# Patient Record
Sex: FEMALE | Race: BLACK OR AFRICAN AMERICAN | Employment: OTHER | URBAN - METROPOLITAN AREA
[De-identification: names, ages, dates, MRNs, and addresses within clinical notes are randomized per-mention and may not be internally consistent; named-entity substitution may affect disease eponyms.]

---

## 2018-03-19 ENCOUNTER — OFFICE VISIT (OUTPATIENT)
Dept: CARDIOLOGY CLINIC | Age: 78
End: 2018-03-19

## 2018-03-19 VITALS
HEIGHT: 60 IN | BODY MASS INDEX: 19.79 KG/M2 | WEIGHT: 100.8 LBS | DIASTOLIC BLOOD PRESSURE: 86 MMHG | RESPIRATION RATE: 18 BRPM | SYSTOLIC BLOOD PRESSURE: 120 MMHG | OXYGEN SATURATION: 98 % | HEART RATE: 68 BPM

## 2018-03-19 DIAGNOSIS — G30.9 ALZHEIMER'S DEMENTIA WITHOUT BEHAVIORAL DISTURBANCE, UNSPECIFIED TIMING OF DEMENTIA ONSET: ICD-10-CM

## 2018-03-19 DIAGNOSIS — I10 ESSENTIAL HYPERTENSION: ICD-10-CM

## 2018-03-19 DIAGNOSIS — F02.80 ALZHEIMER'S DEMENTIA WITHOUT BEHAVIORAL DISTURBANCE, UNSPECIFIED TIMING OF DEMENTIA ONSET: ICD-10-CM

## 2018-03-19 DIAGNOSIS — R06.09 DOE (DYSPNEA ON EXERTION): Primary | ICD-10-CM

## 2018-03-19 DIAGNOSIS — I25.10 CORONARY ARTERY DISEASE INVOLVING NATIVE CORONARY ARTERY WITHOUT ANGINA PECTORIS, UNSPECIFIED WHETHER NATIVE OR TRANSPLANTED HEART: ICD-10-CM

## 2018-03-19 DIAGNOSIS — E78.5 DYSLIPIDEMIA: ICD-10-CM

## 2018-03-19 DIAGNOSIS — Z95.5 S/P CORONARY ARTERY STENT PLACEMENT: ICD-10-CM

## 2018-03-19 RX ORDER — METOPROLOL TARTRATE 25 MG/1
TABLET, FILM COATED ORAL 2 TIMES DAILY
COMMUNITY

## 2018-03-19 RX ORDER — CANE TIPS
EACH MISCELLANEOUS
Qty: 1 DEVICE | Refills: 0 | Status: SHIPPED | OUTPATIENT
Start: 2018-03-19

## 2018-03-19 RX ORDER — LANOLIN ALCOHOL/MO/W.PET/CERES
1000 CREAM (GRAM) TOPICAL DAILY
COMMUNITY

## 2018-03-19 RX ORDER — LOSARTAN POTASSIUM 50 MG/1
TABLET ORAL DAILY
COMMUNITY

## 2018-03-19 RX ORDER — ATORVASTATIN CALCIUM 40 MG/1
TABLET, FILM COATED ORAL DAILY
COMMUNITY

## 2018-03-19 RX ORDER — ALBUTEROL SULFATE 90 UG/1
AEROSOL, METERED RESPIRATORY (INHALATION)
COMMUNITY

## 2018-03-19 RX ORDER — CLOPIDOGREL BISULFATE 75 MG/1
TABLET ORAL
COMMUNITY

## 2018-03-19 RX ORDER — RANOLAZINE 500 MG/1
TABLET, EXTENDED RELEASE ORAL 2 TIMES DAILY
COMMUNITY

## 2018-03-19 RX ORDER — GUAIFENESIN 100 MG/5ML
81 LIQUID (ML) ORAL DAILY
COMMUNITY

## 2018-03-19 RX ORDER — LANOLIN ALCOHOL/MO/W.PET/CERES
400 CREAM (GRAM) TOPICAL DAILY
COMMUNITY

## 2018-03-19 NOTE — MR AVS SNAPSHOT
[address] [id number] [address] [address] [phone] Patient: Santo Escalante MRN: [id number] [id number] Visit Information Date & Time Provider Department Dept. Phone Encounter #  
 3/19/2018  1:20 PM Priya Arias MD [facility] [place]  Follow-up Instructions Return in about 7 weeks (around 5/7/2018). Upcoming Health Maintenance Date Due DTaP/Tdap/Td series (1 - Tdap) 9/23/1961 ZOSTER VACCINE AGE 60> 7/23/2000 GLAUCOMA SCREENING Q2Y 9/23/2005 Bone Densitometry (Dexa) Screening 9/23/2005 Pneumococcal 65+ Low/Medium Risk (1 of 2 - PCV13) 9/23/2005 MEDICARE YEARLY EXAM 9/23/2005 Influenza Age 5 to Adult 8/1/2017 Allergies as of 3/19/2018  Review Complete On: 3/19/2018 By: Priya Arias MD  
  
 Severity Noted Reaction Type Reactions Pcn [Penicillins]  03/19/2018    Hives Current Immunizations  Never Reviewed No immunizations on file. Not reviewed this visit You Were Diagnosed With   
  
 Codes Comments CRUZ (dyspnea on exertion)    -  Primary ICD-10-CM: R06.09 
ICD-9-CM: 786.09 S/P coronary artery stent placement     ICD-10-CM: Z95.5 ICD-9-CM: V45.82 Coronary artery disease involving native coronary artery without angina pectoris, unspecified whether native or transplanted heart     ICD-10-CM: I25.10 ICD-9-CM: 414.01 Essential hypertension     ICD-10-CM: I10 
ICD-9-CM: 401.9 Dyslipidemia     ICD-10-CM: E78.5 ICD-9-CM: 272.4 Alzheimer's dementia without behavioral disturbance, unspecified timing of dementia onset     ICD-10-CM: G30.9, F02.80 ICD-9-CM: 331.0, 294.10 Vitals BP Pulse Resp Height(growth percentile) Weight(growth percentile) SpO2  
 120/86 (BP 1 Location: Right arm, BP Patient Position: Supine) 68 18 5' (1.524 m) 100 lb 12.8 oz (45.7 kg) 98% BMI OB Status Smoking Status 19.69 kg/m2 Menopause Never Smoker Vitals History BMI and BSA Data Body Mass Index Body Surface Area  
 19.69 kg/m 2 1.39 m 2 Your Updated Medication List  
  
   
This list is accurate as of 3/19/18  2:27 PM.  Always use your most recent med list.  
  
  
  
  
 albuterol 90 mcg/actuation inhaler Commonly known as:  PROVENTIL HFA, VENTOLIN HFA, PROAIR HFA Take  by inhalation. aspirin 81 mg chewable tablet Take 81 mg by mouth daily. atorvastatin 40 mg tablet Commonly known as:  LIPITOR Take  by mouth daily. Kana [place] Use as directed  
  
 cyanocobalamin 1,000 mcg tablet Take 1,000 mcg by mouth daily. losartan 50 mg tablet Commonly known as:  COZAAR Take  by mouth daily. magnesium oxide 400 mg tablet Commonly known as:  MAG-OX Take 400 mg by mouth daily. metoprolol tartrate 25 mg tablet Commonly known as:  LOPRESSOR Take  by mouth two (2) times a day. NAMZARIC 28-10 mg Cspx Generic drug:  memantine-donepezil Take  by mouth. PLAVIX 75 mg Tab Generic drug:  clopidogrel Take  by mouth. RANEXA 500 mg SR tablet Generic drug:  ranolazine ER Take  by mouth two (2) times a day. vitamin D3-folic acid 849 unit- 1 mg Tab Take  by mouth. Prescriptions Printed Refills Cane wolf 0 Sig: Use as directed Class: Print We Performed the Following AMB POC EKG ROUTINE W/ 12 LEADS, INTER & REP [57009 CPT(R)] Follow-up Instructions Return in about 7 weeks (around 5/7/2018). To-Do List   
 03/19/2018 ECG:  CARDIAC SPECT REST AND STRESS   
  
 03/19/2018 ECG:  NUCLEAR STRESS TEST Referral Information Referral ID Referred By Referred To  
  
 [id number] Cristine PAUL Not Available Visits Status Start Date End Date 1 New Request 3/19/18 3/19/19  If your referral has a status of pending review or denied, additional information will be sent to support the outcome of this decision. Referral ID Referred By Referred To  
 [id number] Linda PAUL Not Available Visits Status Start Date End Date 1 New Request 3/19/18 3/19/19 If your referral has a status of pending review or denied, additional information will be sent to support the outcome of this decision. [facility]! [facility] introduces [employer] patient portal. Now you can access parts of your medical record, email your doctor's office, and request medication refills online. 1. In your internet browser, go to [email or web address] 2. Click on the First Time User? Click Here link in the Sign In box. You will see the New Member Sign Up page. 3. Enter your [employer] Access Code exactly as it appears below. You will not need to use this code after youve completed the sign-up process. If you do not sign up before the expiration date, you must request a new code. · [employer] Access Code: [email or web address] Expires: 5/20/2018 11:02 AM 
 
4. Enter the last four digits of your Social Security Number (xxxx) and Date of Birth (mm/dd/yyyy) as indicated and click Submit. You will be taken to the next sign-up page. 5. Create a [employer] ID. This will be your [employer] login ID and cannot be changed, so think of one that is secure and easy to remember. 6. Create a [employer] password. You can change your password at any time. 7. Enter your Password Reset Question and Answer. This can be used at a later time if you forget your password. 8. Enter your e-mail address. You will receive e-mail notification when new information is available in [address] 9. Click Sign Up. You can now view and download portions of your medical record. 10. Click the Download Summary menu link to download a portable copy of your medical information.  
 
If you have questions, please visit the Frequently Asked Questions section of the [employer]. Remember, [employer] is NOT to be used for urgent needs. For medical emergencies, dial 911. Now available from your iPhone and Android! Please provide this summary of care documentation to your next provider. If you have any questions after today's visit, please call [phone].

## 2018-03-19 NOTE — PROGRESS NOTES
HISTORY OF PRESENT ILLNESS  Bertha Tariq is a 68 y.o. female. HPI    Patient presents for annual office visit. She  recently relocated to the area to live with her daughter from [place], [place]. She has a past medical history significant for known coronary disease with drug-eluting stents to her LAD in 2015 and more recently in May 2016. Her last cardiac catheterization at that time demonstrated 80% LAD stenosis, left main with 30% stenosis, diagonal 1 with 60% stenosis in RCA with 50% stenosis. EF was 65%. Over the past year or 2, she has developed progressive Alzheimer's dementia. The patient cannot give a good history, but the daughter has noticed that she is having what appears to be more shortness of breath with activity over the past week or 2. She also feels that her activity level is overall decreased from her baseline. She has not been complaining of any chest pain or pressure. No orthopnea, PND or leg swelling. No claudication. No dizziness or syncope. Past Medical History:   Diagnosis Date    Alzheimer's dementia     Coronary artery disease 05/2016    Proximal LAD stenosis, status post drug-eluting stent. Left main residual 30%, diagonal 1 60%, RCA 50%    Dyslipidemia     H/O echocardiogram 05/2016    EF 65%, mild mitral regurgitation    Hypertension     S/P coronary artery stent placement 05/2016    Proximal LAD     Current Outpatient Prescriptions   Medication Sig Dispense Refill    memantine-donepezil (NAMZARIC) 28-10 mg CSpX Take  by mouth.  atorvastatin (LIPITOR) 40 mg tablet Take  by mouth daily.  magnesium oxide (MAG-OX) 400 mg tablet Take 400 mg by mouth daily.  aspirin 81 mg chewable tablet Take 81 mg by mouth daily.  losartan (COZAAR) 50 mg tablet Take  by mouth daily.  vitamin D3-folic acid 764 unit- 1 mg tab Take  by mouth.  albuterol (PROVENTIL HFA, VENTOLIN HFA, PROAIR HFA) 90 mcg/actuation inhaler Take  by inhalation.  metoprolol tartrate (LOPRESSOR) 25 mg tablet Take  by mouth two (2) times a day.  ranolazine ER (RANEXA) 500 mg SR tablet Take  by mouth two (2) times a day.  cyanocobalamin 1,000 mcg tablet Take 1,000 mcg by mouth daily.  clopidogrel (PLAVIX) 75 mg tab Take  by mouth.  Cane wolf Use as directed 1 Device 0     Allergies   Allergen Reactions    Pcn [Penicillins] Hives      Social History   Substance Use Topics    Smoking status: Never Smoker    Smokeless tobacco: Never Used    Alcohol use No     Family History   Problem Relation Age of Onset    Family history unknown: Yes           Review of Systems   Constitutional: Negative for chills, fever and weight loss. HENT: Negative for nosebleeds. Eyes: Negative for blurred vision and double vision. Respiratory: Positive for shortness of breath. Negative for cough and wheezing. Cardiovascular: Negative for chest pain, palpitations, orthopnea, claudication, leg swelling and PND. Gastrointestinal: Negative for abdominal pain, heartburn, nausea and vomiting. Genitourinary: Negative for dysuria and hematuria. Musculoskeletal: Negative for falls and myalgias. Skin: Negative for rash. Neurological: Negative for dizziness, focal weakness and headaches. Endo/Heme/Allergies: Does not bruise/bleed easily. Psychiatric/Behavioral: Negative for substance abuse. Visit Vitals    /86 (BP 1 Location: Right arm, BP Patient Position: Supine)    Pulse 68    Resp 18    Ht 5' (1.524 m)    Wt 45.7 kg (100 lb 12.8 oz)    SpO2 98%    BMI 19.69 kg/m2       Physical Exam   Constitutional: She is oriented to person, place, and time. She appears well-developed and well-nourished. HENT:   Head: Normocephalic and atraumatic. Eyes: Conjunctivae are normal.   Neck: Neck supple. No JVD present. Carotid bruit is not present. Cardiovascular: Normal rate, regular rhythm, S1 normal, S2 normal and normal pulses. Exam reveals no gallop. No murmur heard. Pulmonary/Chest: Effort normal and breath sounds normal. She has no wheezes. She has no rales. Abdominal: Soft. Bowel sounds are normal. There is no tenderness. Musculoskeletal: She exhibits no edema, tenderness or deformity. Neurological: She is alert and oriented to person, place, and time. Skin: Skin is warm and dry. Psychiatric: She has a normal mood and affect. Her behavior is normal. Thought content normal.     EKG: Normal sinus rhythm, leftward axis, borderline voltage criteria for LVH, nonspecific T-wave abnormality in the anteroseptal leads. No ST segment changes. Compared to the previous EKG, the nonspecific T-wave changes are new. ASSESSMENT and PLAN  Encounter Diagnoses   Name Primary?  CRUZ (dyspnea on exertion) Yes    S/P coronary artery stent placement     Coronary artery disease involving native coronary artery without angina pectoris, unspecified whether native or transplanted heart     Essential hypertension     Dyslipidemia     Alzheimer's dementia without behavioral disturbance, unspecified timing of dementia onset      Dyspnea on exertion. Patient is an otherwise very poor historian because of her dementia. This may represent an anginal equivalent especially with her history of coronary disease and prior LAD stenting. She does have new nonspecific T-wave inversions on her electrocardiogram today. I have recommended a pharmacologic nuclear stress test for further evaluation. As long as this is not a high risk study, I would continue with medical therapy. Coronary disease. History of drug-eluting stent placement to her proximal LAD in 2016. She had mild to moderate residual disease elsewhere on cardiac catheterization at that time. She has remained on dual antiplatelet therapy, a potent statin and dual antianginal therapy since that time. I will continue her current medical regimen. Essential hypertension.   Patient blood pressure appears to be well-controlled on her current regimen of metoprolol losartan, both of which I would continue. Dyslipidemia. Patient is been managed with atorvastatin 40 mg daily. I prefer LDL to be less than 70 given her history of CAD. Alzheimer's dementia. Apparently this has become progressively worse over the past 1-2 years and she now resides with her daughter in the area. Follow-up in 1-2 months, sooner if needed.

## 2018-03-29 ENCOUNTER — HOSPITAL ENCOUNTER (OUTPATIENT)
Dept: NON INVASIVE DIAGNOSTICS | Age: 78
Discharge: HOME | End: 2018-03-29
Attending: INTERNAL MEDICINE
Payer: COMMERCIAL

## 2018-03-29 DIAGNOSIS — I25.10 CORONARY ARTERY DISEASE INVOLVING NATIVE CORONARY ARTERY WITHOUT ANGINA PECTORIS, UNSPECIFIED WHETHER NATIVE OR TRANSPLANTED HEART: ICD-10-CM

## 2018-03-29 DIAGNOSIS — R06.09 DOE (DYSPNEA ON EXERTION): ICD-10-CM

## 2018-03-29 LAB
ATTENDING PHYSICIAN, CST07: NORMAL
DIAGNOSIS, 93000: NORMAL
DUKE TM SCORE RESULT, CST14: NORMAL
DUKE TREADMILL SCORE, CST13: NORMAL
ECG INTERP BEFORE EX, CST11: NORMAL
ECG INTERP DURING EX, CST12: NORMAL
FUNCTIONAL CAPACITY, CST17: NORMAL
KNOWN CARDIAC CONDITION, CST08: NORMAL
MAX. DIASTOLIC BP, CST04: 60 MMHG
MAX. HEART RATE, CST05: 107 BPM
MAX. SYSTOLIC BP, CST03: 120 MMHG
OVERALL BP RESPONSE TO EXERCISE, CST16: NORMAL
OVERALL HR RESPONSE TO EXERCISE, CST15: NORMAL
PEAK EX METS, CST10: 1 METS
PROTOCOL NAME, CST01: NORMAL
TEST INDICATION, CST09: NORMAL

## 2018-03-29 PROCEDURE — A9500 TC99M SESTAMIBI: HCPCS

## 2018-03-29 PROCEDURE — 74011000258 HC RX REV CODE- 258: Performed by: INTERNAL MEDICINE

## 2018-03-29 PROCEDURE — 74011250636 HC RX REV CODE- 250/636: Performed by: INTERNAL MEDICINE

## 2018-03-29 PROCEDURE — 78452 HT MUSCLE IMAGE SPECT MULT: CPT | Performed by: INTERNAL MEDICINE

## 2018-03-29 PROCEDURE — 93017 CV STRESS TEST TRACING ONLY: CPT | Performed by: INTERNAL MEDICINE

## 2018-03-29 RX ORDER — SODIUM CHLORIDE 9 MG/ML
100 INJECTION, SOLUTION INTRAVENOUS ONCE
Status: COMPLETED | OUTPATIENT
Start: 2018-03-29 | End: 2018-03-29

## 2018-03-29 RX ADMIN — REGADENOSON 0.4 MG: 0.08 INJECTION, SOLUTION INTRAVENOUS at 10:00

## 2018-03-29 RX ADMIN — SODIUM CHLORIDE 100 ML/HR: 900 INJECTION, SOLUTION INTRAVENOUS at 10:00

## 2018-03-29 NOTE — PROGRESS NOTES
Patient was given 10.76 milliCuries of 99mTc-Sestamibi for the resting images. Patient was also given 33.0 milliCuries of 99mTc-Sestamibi for the stress images. Injected with 0.4mg Lexiscan. Patient's armband was discarded and shredded.

## 2018-03-29 NOTE — PROCEDURES
[address]  MR#: [id number]  : 1940  ACCOUNT #: [de-identified]   DATE OF SERVICE: 2018    PHARMACOLOGICAL NUCLEAR STRESS TEST    INDICATION:  Coronary artery disease, dyspnea on exertion with concern for anginal equivalent. BASELINE EKG:  Sinus rhythm. Anterior T-wave abnormality. PROTOCOL:  Patient was given Lexiscan infusion to right antecubital IV. 10 mCi of sestamibi was injected for rest and 30 mCi sestamibi injected for stress. Gated process was performed. EKG FINDINGS:  No arrhythmias. No significant change beyond baseline. NUCLEAR FINDINGS:  Left ventricular systolic function is normal and calculated at 88%. No regional wall motion abnormalities, transient ischemic dilatation, ischemia or previous infarct. IMPRESSION:  1. Low risk pharmacological nuclear stress test.  2.  Normal left ventricular systolic function calculated at 88%. 3.  No ischemia or previous infarct based on perfusion imaging.       MD FERNANDO Price / RN  D: 2018 12:23     T: 2018 15:35  JOB #: [id number]

## 2018-03-30 ENCOUNTER — TELEPHONE (OUTPATIENT)
Dept: CARDIOLOGY CLINIC | Age: 78
End: 2018-03-30

## 2018-03-30 NOTE — TELEPHONE ENCOUNTER
----- Message from Maulik García MD sent at 3/30/2018  9:57 AM EDT -----  Please let the patient know that her nuclear stress test was normal  ----- Message -----     From: Michelle Peña     Sent: 3/30/2018   8:58 AM       To: Maulik García MD    Dyspnea on exertion. Patient is an otherwise very poor historian because of her dementia. This may represent an anginal equivalent especially with her history of coronary disease and prior LAD stenting. She does have new nonspecific T-wave inversions on her electrocardiogram today. I have recommended a pharmacologic nuclear stress test for further evaluation. As long as this is not a high risk study, I would continue with medical therapy.     Coronary disease. History of drug-eluting stent placement to her proximal LAD in 2016. She had mild to moderate residual disease elsewhere on cardiac catheterization at that time. She has remained on dual antiplatelet therapy, a potent statin and dual antianginal therapy since that time.   I will continue her current medical regimen.

## 2018-03-30 NOTE — PROGRESS NOTES
Dyspnea on exertion. Patient is an otherwise very poor historian because of her dementia. This may represent an anginal equivalent especially with her history of coronary disease and prior LAD stenting. She does have new nonspecific T-wave inversions on her electrocardiogram today. I have recommended a pharmacologic nuclear stress test for further evaluation. As long as this is not a high risk study, I would continue with medical therapy.     Coronary disease. History of drug-eluting stent placement to her proximal LAD in 2016. She had mild to moderate residual disease elsewhere on cardiac catheterization at that time. She has remained on dual antiplatelet therapy, a potent statin and dual antianginal therapy since that time. I will continue her current medical regimen.

## 2018-03-30 NOTE — TELEPHONE ENCOUNTER
Called patient. Verified  and full name. Informed about results per Dr Walker Hassan. Patient verbalized understanding and agreed with the plan of care.

## 2018-04-20 ENCOUNTER — HOSPITAL ENCOUNTER (OUTPATIENT)
Age: 78
Discharge: HOME | End: 2018-04-20
Attending: PSYCHIATRY & NEUROLOGY
Payer: COMMERCIAL

## 2018-04-20 DIAGNOSIS — G30.1 DEMENTIA OF THE ALZHEIMER'S TYPE, WITH LATE ONSET, WITH DELIRIUM (HCC): ICD-10-CM

## 2018-04-20 DIAGNOSIS — F02.82 DEMENTIA OF THE ALZHEIMER'S TYPE, WITH LATE ONSET, WITH DELIRIUM (HCC): ICD-10-CM

## 2018-04-20 PROCEDURE — 70551 MRI BRAIN STEM W/O DYE: CPT

## 2018-11-21 ENCOUNTER — HOSPITAL ENCOUNTER (EMERGENCY)
Age: 78
Discharge: HOME | End: 2018-11-21
Attending: EMERGENCY MEDICINE
Payer: MEDICARE

## 2018-11-21 ENCOUNTER — APPOINTMENT (OUTPATIENT)
Dept: GENERAL RADIOLOGY | Age: 78
End: 2018-11-21
Attending: PHYSICIAN ASSISTANT
Payer: MEDICARE

## 2018-11-21 VITALS
HEIGHT: 60 IN | DIASTOLIC BLOOD PRESSURE: 60 MMHG | WEIGHT: 87 LBS | SYSTOLIC BLOOD PRESSURE: 117 MMHG | RESPIRATION RATE: 18 BRPM | HEART RATE: 67 BPM | OXYGEN SATURATION: 100 % | TEMPERATURE: 98.3 F | BODY MASS INDEX: 17.08 KG/M2

## 2018-11-21 DIAGNOSIS — K59.00 CONSTIPATION, UNSPECIFIED CONSTIPATION TYPE: Primary | ICD-10-CM

## 2018-11-21 LAB
APPEARANCE UR: CLEAR
BACTERIA URNS QL MICRO: NEGATIVE /HPF
BILIRUB UR QL: NEGATIVE
COLOR UR: YELLOW
EPITH CASTS URNS QL MICRO: NORMAL /LPF (ref 0–5)
GLUCOSE UR STRIP.AUTO-MCNC: NEGATIVE MG/DL
HGB UR QL STRIP: NEGATIVE
KETONES UR QL STRIP.AUTO: NEGATIVE MG/DL
LEUKOCYTE ESTERASE UR QL STRIP.AUTO: ABNORMAL
NITRITE UR QL STRIP.AUTO: NEGATIVE
PH UR STRIP: 5.5 [PH] (ref 5–8)
PROT UR STRIP-MCNC: NEGATIVE MG/DL
RBC #/AREA URNS HPF: NORMAL /HPF (ref 0–5)
SP GR UR REFRACTOMETRY: 1.01 (ref 1–1.03)
UROBILINOGEN UR QL STRIP.AUTO: 0.2 EU/DL (ref 0.2–1)
WBC URNS QL MICRO: NORMAL /HPF (ref 0–4)

## 2018-11-21 PROCEDURE — 99283 EMERGENCY DEPT VISIT LOW MDM: CPT

## 2018-11-21 PROCEDURE — 74019 RADEX ABDOMEN 2 VIEWS: CPT

## 2018-11-21 PROCEDURE — 81001 URINALYSIS AUTO W/SCOPE: CPT

## 2018-11-21 RX ORDER — LACTULOSE 10 G/15ML
20 SOLUTION ORAL; RECTAL 2 TIMES DAILY
Qty: 480 ML | Refills: 0 | Status: SHIPPED | OUTPATIENT
Start: 2018-11-21 | End: 2018-11-29

## 2018-11-21 RX ORDER — GLYCERIN ADULT
1 SUPPOSITORY, RECTAL RECTAL
Qty: 1 SUPPOSITORY | Refills: 0 | Status: SHIPPED | OUTPATIENT
Start: 2018-11-21 | End: 2018-11-21

## 2018-11-21 NOTE — ED NOTES
Julita Hernandez is a 66 y.o. female that was discharged in stable. Pt was accompanied by daughter. Pt is not driving. The patients diagnosis, condition and treatment were explained to  patient and aftercare instructions were given. The patient and caregiver verbalized understanding. Patient armband removed and shredded.

## 2018-11-21 NOTE — DISCHARGE INSTRUCTIONS
Constipation: Care Instructions  Your Care Instructions    Constipation means that you have a hard time passing stools (bowel movements). People pass stools from 3 times a day to once every 3 days. What is normal for you may be different. Constipation may occur with pain in the rectum and cramping. The pain may get worse when you try to pass stools. Sometimes there are small amounts of bright red blood on toilet paper or the surface of stools. This is because of enlarged veins near the rectum (hemorrhoids). A few changes in your diet and lifestyle may help you avoid ongoing constipation. Your doctor may also prescribe medicine to help loosen your stool. Some medicines can cause constipation. These include pain medicines and antidepressants. Tell your doctor about all the medicines you take. Your doctor may want to make a medicine change to ease your symptoms. Follow-up care is a key part of your treatment and safety. Be sure to make and go to all appointments, and call your doctor if you are having problems. It's also a good idea to know your test results and keep a list of the medicines you take. How can you care for yourself at home? · Drink plenty of fluids, enough so that your urine is light yellow or clear like water. If you have kidney, heart, or liver disease and have to limit fluids, talk with your doctor before you increase the amount of fluids you drink. · Include high-fiber foods in your diet each day. These include fruits, vegetables, beans, and whole grains. · Get at least 30 minutes of exercise on most days of the week. Walking is a good choice. You also may want to do other activities, such as running, swimming, cycling, or playing tennis or team sports. · Take a fiber supplement, such as Citrucel or Metamucil, every day. Read and follow all instructions on the label. · Schedule time each day for a bowel movement. A daily routine may help.  Take your time having your bowel movement. · Support your feet with a small step stool when you sit on the toilet. This helps flex your hips and places your pelvis in a squatting position. · Your doctor may recommend an over-the-counter laxative to relieve your constipation. Examples are Milk of Magnesia and MiraLax. Read and follow all instructions on the label. Do not use laxatives on a long-term basis. When should you call for help? Call your doctor now or seek immediate medical care if:    · You have new or worse belly pain.     · You have new or worse nausea or vomiting.     · You have blood in your stools.    Watch closely for changes in your health, and be sure to contact your doctor if:    · Your constipation is getting worse.     · You do not get better as expected. Where can you learn more? Go to http://rosibel-hima.info/. Enter [phone] in the search box to learn more about \"Constipation: Care Instructions. \"  Current as of: November 20, 2017  Content Version: 11.8  © 4945-4261 [employer]. Care instructions adapted under license by [employer] (which disclaims liability or warranty for this information). If you have questions about a medical condition or this instruction, always ask your healthcare professional. [address] any warranty or liability for your use of this information.

## 2018-11-21 NOTE — ED TRIAGE NOTES
Patient's daughter accompanied patient to ER with c/o low blood pressure reading of 105/52 this morning. States possible constipation. States last bowel movement was today and was noted as \" medium ball\".

## 2018-11-21 NOTE — ED PROVIDER NOTES
EMERGENCY DEPARTMENT HISTORY AND PHYSICAL EXAM 
 
Date: 11/21/2018 Patient Name: Petros Branham History of Presenting Illness Chief Complaint Patient presents with  Hypotension  Constipation History Provided By: Patient and Patient's Daughter Chief Complaint: hypotension Duration: 1 Weeks Timing:  Acute Location:  
Quality:  
Severity: N/A Modifying Factors: na 
Associated Symptoms: denies any other associated signs or symptoms Additional History (Context): Petros Branham is a 66 y.o. female with history of  hypertension and CAD, Alztheimers who presents to the ED with a complaint of hypotension and constipation. Daughter states that last bowel movement was this am.  She states they were at physical therapy and told that pt was hypotensive and possibly septic and told to come to the ED. PCP: None Current Facility-Administered Medications Medication Dose Route Frequency Provider Last Rate Last Dose  lactulose (CHRONULAC) solution 20 g  20 g Oral NOW Jason Lin, PA Current Outpatient Medications Medication Sig Dispense Refill  glycerin, adult, (FLEET GLYCERIN, ADULT,) suppository Insert 1 Suppository into rectum now for 1 dose. 1 Suppository 0  
 memantine-donepezil (NAMZARIC) 28-10 mg CSpX Take  by mouth.  atorvastatin (LIPITOR) 40 mg tablet Take  by mouth daily.  magnesium oxide (MAG-OX) 400 mg tablet Take 400 mg by mouth daily.  aspirin 81 mg chewable tablet Take 81 mg by mouth daily.  losartan (COZAAR) 50 mg tablet Take  by mouth daily.  vitamin D3-folic acid 310 unit- 1 mg tab Take  by mouth.  albuterol (PROVENTIL HFA, VENTOLIN HFA, PROAIR HFA) 90 mcg/actuation inhaler Take  by inhalation.  metoprolol tartrate (LOPRESSOR) 25 mg tablet Take  by mouth two (2) times a day.  ranolazine ER (RANEXA) 500 mg SR tablet Take  by mouth two (2) times a day.  cyanocobalamin 1,000 mcg tablet Take 1,000 mcg by mouth daily.  clopidogrel (PLAVIX) 75 mg tab Take  by mouth.  Cane wolf Use as directed 1 Device 0 Past History Past Medical History: 
Past Medical History:  
Diagnosis Date  Alzheimer's dementia  Coronary artery disease 05/2016 Proximal LAD stenosis, status post drug-eluting stent. Left main residual 30%, diagonal 1 60%, RCA 50%  Dyslipidemia  H/O echocardiogram 05/2016 EF 65%, mild mitral regurgitation  Hypertension  S/P coronary artery stent placement 05/2016 Proximal LAD Past Surgical History: 
History reviewed. No pertinent surgical history. Family History: 
Family History Family history unknown: Yes  
 
 
Social History: 
Social History Tobacco Use  Smoking status: Never Smoker  Smokeless tobacco: Never Used Substance Use Topics  Alcohol use: No  
 Drug use: No  
 
 
Allergies: Allergies Allergen Reactions  Pcn [Penicillins] Hives Review of Systems Review of Systems Constitutional: Negative for fatigue and fever. HENT: Negative for congestion. Respiratory: Negative for cough and shortness of breath. Cardiovascular: Negative for chest pain. Gastrointestinal: Negative for abdominal pain, diarrhea, nausea and vomiting. Genitourinary: Negative for dysuria. Musculoskeletal: Negative for back pain. Skin: Negative for wound. Neurological: Negative for dizziness and headaches. All other systems reviewed and are negative. All Other Systems Negative Physical Exam  
 
Vitals:  
 11/21/18 1505 BP: 117/60 Pulse: 67 Resp: 18 Temp: 98.3 °F (36.8 °C) SpO2: 100% Weight: 39.5 kg (87 lb) Height: 5' (1.524 m) Physical Exam  
Constitutional: She appears well-developed and well-nourished. No distress. HENT:  
Head: Normocephalic and atraumatic. Nose: Nose normal.  
Eyes: Conjunctivae are normal.  
Neck: Normal range of motion. Cardiovascular: Normal rate, regular rhythm and normal heart sounds. Pulmonary/Chest: Effort normal and breath sounds normal. No respiratory distress. She has no wheezes. Abdominal: Soft. Bowel sounds are normal. She exhibits no distension. There is no tenderness. Musculoskeletal: Normal range of motion. Neurological: She is alert. No cranial nerve deficit. GCS eye subscore is 4. GCS verbal subscore is 5. GCS motor subscore is 6. Pt is alert and oriented only to person Vitals reviewed. Diagnostic Study Results Labs - Recent Results (from the past 12 hour(s)) URINALYSIS W/ RFLX MICROSCOPIC Collection Time: 11/21/18  3:40 PM  
Result Value Ref Range Color YELLOW Appearance CLEAR Specific gravity 1.015 1.005 - 1.030    
 pH (UA) 5.5 5.0 - 8.0 Protein NEGATIVE  NEG mg/dL Glucose NEGATIVE  NEG mg/dL Ketone NEGATIVE  NEG mg/dL Bilirubin NEGATIVE  NEG Blood NEGATIVE  NEG Urobilinogen 0.2 0.2 - 1.0 EU/dL Nitrites NEGATIVE  NEG Leukocyte Esterase TRACE (A) NEG URINE MICROSCOPIC ONLY Collection Time: 11/21/18  3:40 PM  
Result Value Ref Range WBC 0 to 3 0 - 4 /hpf  
 RBC NONE 0 - 5 /hpf Epithelial cells 1+ 0 - 5 /lpf Bacteria NEGATIVE  NEG /hpf Radiologic Studies -  
XR ABD (AP AND ERECT OR DECUB)    (Results Pending) CT Results  (Last 48 hours) None CXR Results  (Last 48 hours) None Medical Decision Making I am the first provider for this patient. I reviewed the vital signs, available nursing notes, past medical history, past surgical history, family history and social history. Vital Signs-Reviewed the patient's vital signs. Pulse Oximetry Analysis - 100% on RA Records Reviewed: Old Medical Records Procedures: 
Procedures Provider Notes (Medical Decision Making): Xray shows large stool burden. WIll treat with lactulose and glycerin suppository. MED RECONCILIATION: 
Current Facility-Administered Medications Medication Dose Route Frequency  lactulose (CHRONULAC) solution 20 g  20 g Oral NOW Current Outpatient Medications Medication Sig  
 glycerin, adult, (FLEET GLYCERIN, ADULT,) suppository Insert 1 Suppository into rectum now for 1 dose.  memantine-donepezil (NAMZARIC) 28-10 mg CSpX Take  by mouth.  atorvastatin (LIPITOR) 40 mg tablet Take  by mouth daily.  magnesium oxide (MAG-OX) 400 mg tablet Take 400 mg by mouth daily.  aspirin 81 mg chewable tablet Take 81 mg by mouth daily.  losartan (COZAAR) 50 mg tablet Take  by mouth daily.  vitamin D3-folic acid 848 unit- 1 mg tab Take  by mouth.  albuterol (PROVENTIL HFA, VENTOLIN HFA, PROAIR HFA) 90 mcg/actuation inhaler Take  by inhalation.  metoprolol tartrate (LOPRESSOR) 25 mg tablet Take  by mouth two (2) times a day.  ranolazine ER (RANEXA) 500 mg SR tablet Take  by mouth two (2) times a day.  cyanocobalamin 1,000 mcg tablet Take 1,000 mcg by mouth daily.  clopidogrel (PLAVIX) 75 mg tab Take  by mouth.  Cane wolf Use as directed Disposition: 
Discharge DISCHARGE NOTE:  
 
Pt has been reexamined. Patient has no new complaints, changes, or physical findings. Care plan outlined and precautions discussed. Results of visit were reviewed with the patient. All medications were reviewed with the patient; will d/c home with glycerine suppository. All of pt's questions and concerns were addressed. Patient was instructed and agrees to follow up with PCP, as well as to return to the ED upon further deterioration. Patient is ready to go home. Follow-up Information Follow up With Specialties Details Why Contact Info [address]  Call As needed, follow up [address] [place] [address] [id number] [phone] [id number] [facility] EMERGENCY DEPT Emergency Medicine  If symptoms worsen [address] [place] [id number] [phone] Current Discharge Medication List  
  
START taking these medications Details  
glycerin, adult, (FLEET GLYCERIN, ADULT,) suppository Insert 1 Suppository into rectum now for 1 dose. Qty: 1 Suppository, Refills: 0 Diagnosis Clinical Impression: 1. Constipation, unspecified constipation type

## 2021-08-03 PROBLEM — I10 ESSENTIAL HYPERTENSION: Status: RESOLVED | Noted: 2018-03-19 | Resolved: 2021-08-03
